# Patient Record
Sex: FEMALE | Race: BLACK OR AFRICAN AMERICAN | Employment: FULL TIME | URBAN - METROPOLITAN AREA
[De-identification: names, ages, dates, MRNs, and addresses within clinical notes are randomized per-mention and may not be internally consistent; named-entity substitution may affect disease eponyms.]

---

## 2017-02-12 ENCOUNTER — APPOINTMENT (OUTPATIENT)
Dept: CT IMAGING | Age: 47
End: 2017-02-12
Attending: FAMILY MEDICINE
Payer: MEDICAID

## 2017-02-12 ENCOUNTER — HOSPITAL ENCOUNTER (OUTPATIENT)
Age: 47
Discharge: HOME | End: 2017-02-12
Attending: FAMILY MEDICINE
Payer: MEDICAID

## 2017-02-12 VITALS
SYSTOLIC BLOOD PRESSURE: 141 MMHG | OXYGEN SATURATION: 100 % | RESPIRATION RATE: 18 BRPM | DIASTOLIC BLOOD PRESSURE: 86 MMHG | TEMPERATURE: 98 F | BODY MASS INDEX: 33 KG/M2 | WEIGHT: 202 LBS | HEART RATE: 81 BPM

## 2017-02-12 DIAGNOSIS — R31.9 HEMATURIA: ICD-10-CM

## 2017-02-12 DIAGNOSIS — N12 PYELONEPHRITIS: Primary | ICD-10-CM

## 2017-02-12 DIAGNOSIS — R10.9 RIGHT FLANK PAIN: ICD-10-CM

## 2017-02-12 LAB
POCT BILIRUBIN URINE: NEGATIVE
POCT GLUCOSE URINE: NEGATIVE MG/DL
POCT KETONE URINE: NEGATIVE MG/DL
POCT NITRITE URINE: NEGATIVE
POCT PH URINE: 5.5 (ref 5–8)
POCT PROTEIN URINE: NEGATIVE MG/DL
POCT SPECIFIC GRAVITY URINE: 1.02
POCT URINE CLARITY: CLEAR
POCT URINE PREGNANCY: NEGATIVE
POCT UROBILINOGEN URINE: 0.2 MG/DL

## 2017-02-12 PROCEDURE — 99214 OFFICE O/P EST MOD 30 MIN: CPT

## 2017-02-12 PROCEDURE — 74176 CT ABD & PELVIS W/O CONTRAST: CPT

## 2017-02-12 PROCEDURE — 87086 URINE CULTURE/COLONY COUNT: CPT | Performed by: FAMILY MEDICINE

## 2017-02-12 PROCEDURE — 81025 URINE PREGNANCY TEST: CPT | Performed by: FAMILY MEDICINE

## 2017-02-12 PROCEDURE — 81002 URINALYSIS NONAUTO W/O SCOPE: CPT | Performed by: FAMILY MEDICINE

## 2017-02-12 RX ORDER — IBUPROFEN 600 MG/1
600 TABLET ORAL ONCE
Status: COMPLETED | OUTPATIENT
Start: 2017-02-12 | End: 2017-02-12

## 2017-02-12 RX ORDER — CIPROFLOXACIN 500 MG/1
500 TABLET, FILM COATED ORAL 2 TIMES DAILY
Qty: 14 TABLET | Refills: 0 | Status: SHIPPED | OUTPATIENT
Start: 2017-02-12 | End: 2017-08-08 | Stop reason: ALTCHOICE

## 2017-02-12 NOTE — ED PROVIDER NOTES
Patient Seen in: THE [facility] In [facility] END    History   Patient presents with:  Back Pain: Rt.    Stated Complaint: BACK PAIN    HPI    This 49-year-old female presents to the office with right-sided flank pain which is been present for about Current:/86 mmHg  Pulse 81  Temp(Src) 98.3 °F (36.8 °C) (Oral)  Resp 18  Wt 91.627 kg  SpO2 100%  LMP         Physical Exam    General: WH/WN/WD, in moderate discomfort due to right flank pain, A and O times 3  HEAD: Normocephalic, atraumatic localize potential stones in the cranio-caudal plane. Dose reduction techniques were used. Dose information is transmitted to the [address] [facility]) [facility] ([address]) which includes the Dose Index Registry.   PATIENT She may continue with ibuprofen as needed for discomfort. She is instructed to go the emergency room if she has any worsening symptoms. She is to follow-up with her primary doctor in 2-3 days if not improving.       Disposition and Plan     Clinical Impre

## 2017-02-12 NOTE — ED INITIAL ASSESSMENT (HPI)
Rt. Mid to low back pain for about 1 week. Today seem to travel to her Rt. Abdomen. Some urinary urgency. Dad has hx of kidney stones.   Pt. Denies any new exercise programs

## 2017-02-13 NOTE — ED NOTES
Patient called stating that she is afraid to take Cipro that was prescribed to her due to the side effects she may experience. Patient states she has not taken any of the med.  Patient voices concerns that she may develop liver issues, may have hallucinatio

## 2017-08-08 ENCOUNTER — APPOINTMENT (OUTPATIENT)
Dept: GENERAL RADIOLOGY | Age: 47
End: 2017-08-08
Attending: FAMILY MEDICINE
Payer: COMMERCIAL

## 2017-08-08 ENCOUNTER — HOSPITAL ENCOUNTER (OUTPATIENT)
Age: 47
Discharge: HOME | End: 2017-08-08
Attending: FAMILY MEDICINE
Payer: COMMERCIAL

## 2017-08-08 ENCOUNTER — APPOINTMENT (OUTPATIENT)
Dept: CT IMAGING | Age: 47
End: 2017-08-08
Attending: FAMILY MEDICINE
Payer: COMMERCIAL

## 2017-08-08 VITALS
DIASTOLIC BLOOD PRESSURE: 90 MMHG | OXYGEN SATURATION: 100 % | RESPIRATION RATE: 16 BRPM | TEMPERATURE: 98 F | SYSTOLIC BLOOD PRESSURE: 154 MMHG | HEART RATE: 86 BPM

## 2017-08-08 DIAGNOSIS — I31.3 IDIOPATHIC PERICARDIAL EFFUSION: ICD-10-CM

## 2017-08-08 DIAGNOSIS — R10.9 ABDOMINAL PAIN OF UNKNOWN ETIOLOGY: Primary | ICD-10-CM

## 2017-08-08 LAB
#LYMPHOCYTE IC: 2.9 X10ˆ3/UL (ref 0.9–3.2)
#MXD IC: 0.4 X10ˆ3/UL (ref 0.1–1)
#NEUTROPHIL IC: 2.7 X10ˆ3/UL (ref 1.3–6.7)
CREAT SERPL-MCNC: 0.9 MG/DL (ref 0.4–1)
FREE T4: 0.9 NG/DL (ref 0.9–1.8)
GLUCOSE BLD-MCNC: 102 MG/DL (ref 65–99)
HCT IC: 35.6 % (ref 37–54)
HGB IC: 11.5 G/DL (ref 11.7–16)
ISTAT BLOOD GAS TCO2: 26 MMOL/L (ref 22–32)
ISTAT BUN: 6 MG/DL (ref 8–20)
ISTAT CHLORIDE: 102 MMOL/L (ref 101–111)
ISTAT HEMATOCRIT: 38 % (ref 37–54)
ISTAT IONIZED CALCIUM: 1.19 MMOL/L (ref 1.12–1.32)
ISTAT POTASSIUM: 4.1 MMOL/L (ref 3.6–5.1)
ISTAT SODIUM: 140 MMOL/L (ref 136–144)
ISTAT TROPONIN: <0.1 NG/ML (ref ?–0.1)
LIPASE: 176 U/L (ref 73–393)
LYMPHOCYTES NFR BLD AUTO: 48.2 %
MCH IC: 30.4 PG (ref 27–33.2)
MCHC IC: 32.3 G/DL (ref 31–37)
MCV IC: 94.2 FL (ref 81–100)
MIXED CELL %: 7.4 %
NEUTROPHILS NFR BLD AUTO: 44.4 %
PLT IC: 253 X10ˆ3/UL (ref 150–450)
POCT BILIRUBIN URINE: NEGATIVE
POCT GLUCOSE URINE: NEGATIVE MG/DL
POCT KETONE URINE: NEGATIVE MG/DL
POCT NITRITE URINE: NEGATIVE
POCT PH URINE: 6 (ref 5–8)
POCT PROTEIN URINE: NEGATIVE MG/DL
POCT SPECIFIC GRAVITY URINE: 1.01
POCT URINE CLARITY: CLEAR
POCT URINE COLOR: YELLOW
POCT UROBILINOGEN URINE: 0.2 MG/DL
RBC IC: 3.78 X10ˆ6/UL (ref 3.8–5.1)
TSI SER-ACNC: 2.36 MIU/ML (ref 0.35–5.5)
WBC IC: 6 X10ˆ3/UL (ref 4–13)

## 2017-08-08 PROCEDURE — 99215 OFFICE O/P EST HI 40 MIN: CPT

## 2017-08-08 PROCEDURE — 84484 ASSAY OF TROPONIN QUANT: CPT

## 2017-08-08 PROCEDURE — 93010 ELECTROCARDIOGRAM REPORT: CPT

## 2017-08-08 PROCEDURE — 36415 COLL VENOUS BLD VENIPUNCTURE: CPT

## 2017-08-08 PROCEDURE — 71020 XR CHEST PA + LAT CHEST (CPT=71020): CPT | Performed by: FAMILY MEDICINE

## 2017-08-08 PROCEDURE — 74176 CT ABD & PELVIS W/O CONTRAST: CPT | Performed by: FAMILY MEDICINE

## 2017-08-08 PROCEDURE — 83690 ASSAY OF LIPASE: CPT | Performed by: FAMILY MEDICINE

## 2017-08-08 PROCEDURE — 87086 URINE CULTURE/COLONY COUNT: CPT | Performed by: FAMILY MEDICINE

## 2017-08-08 PROCEDURE — 81002 URINALYSIS NONAUTO W/O SCOPE: CPT | Performed by: FAMILY MEDICINE

## 2017-08-08 PROCEDURE — 84439 ASSAY OF FREE THYROXINE: CPT | Performed by: FAMILY MEDICINE

## 2017-08-08 PROCEDURE — 93005 ELECTROCARDIOGRAM TRACING: CPT

## 2017-08-08 PROCEDURE — 84443 ASSAY THYROID STIM HORMONE: CPT | Performed by: FAMILY MEDICINE

## 2017-08-08 PROCEDURE — 80047 BASIC METABLC PNL IONIZED CA: CPT

## 2017-08-08 PROCEDURE — 85025 COMPLETE CBC W/AUTO DIFF WBC: CPT | Performed by: FAMILY MEDICINE

## 2017-08-08 NOTE — ED INITIAL ASSESSMENT (HPI)
Pt c/o 2 weeks of dull pain to entire middle of body. States started on left abdomen, then switched to right abdomen, then began to wrap around middle to back for the last 2 days. Denies nausea, diarrhea, constipation, dysuria or menstrual problems.   Cam

## 2017-08-09 NOTE — ED NOTES
Pt eating and drinking. States her abdomen is feeling better now. Awaiting Cardiology return call. Pt remains stable.

## 2017-08-10 NOTE — ED NOTES
Pt contacts clinic regarding test results. Pt's results reviewed from visit including lipase and tsh/t4 which both were resulted within normal limits.

## 2017-08-11 ENCOUNTER — APPOINTMENT (OUTPATIENT)
Dept: GENERAL RADIOLOGY | Facility: HOSPITAL | Age: 47
End: 2017-08-11
Attending: EMERGENCY MEDICINE
Payer: COMMERCIAL

## 2017-08-11 ENCOUNTER — HOSPITAL ENCOUNTER (EMERGENCY)
Facility: HOSPITAL | Age: 47
Discharge: HOME | End: 2017-08-11
Attending: EMERGENCY MEDICINE
Payer: COMMERCIAL

## 2017-08-11 ENCOUNTER — APPOINTMENT (OUTPATIENT)
Dept: CT IMAGING | Facility: HOSPITAL | Age: 47
End: 2017-08-11
Attending: EMERGENCY MEDICINE
Payer: COMMERCIAL

## 2017-08-11 VITALS
RESPIRATION RATE: 16 BRPM | BODY MASS INDEX: 32.47 KG/M2 | HEIGHT: 66 IN | OXYGEN SATURATION: 98 % | TEMPERATURE: 97 F | HEART RATE: 81 BPM | SYSTOLIC BLOOD PRESSURE: 127 MMHG | WEIGHT: 202 LBS | DIASTOLIC BLOOD PRESSURE: 99 MMHG

## 2017-08-11 DIAGNOSIS — R07.9 ACUTE CHEST PAIN: Primary | ICD-10-CM

## 2017-08-11 LAB
ALBUMIN SERPL-MCNC: 3.9 G/DL (ref 3.5–4.8)
ALP LIVER SERPL-CCNC: 81 U/L (ref 39–100)
ALT SERPL-CCNC: 20 U/L (ref 14–54)
AST SERPL-CCNC: 9 U/L (ref 15–41)
BASOPHILS # BLD AUTO: 0.01 X10(3) UL (ref 0–0.1)
BASOPHILS NFR BLD AUTO: 0.2 %
BILIRUB SERPL-MCNC: 0.5 MG/DL (ref 0.1–2)
BUN BLD-MCNC: 18 MG/DL (ref 8–20)
CALCIUM BLD-MCNC: 9.2 MG/DL (ref 8.3–10.3)
CHLORIDE: 104 MMOL/L (ref 101–111)
CO2: 27 MMOL/L (ref 22–32)
CREAT BLD-MCNC: 1.03 MG/DL (ref 0.55–1.02)
D-DIMER: 0.95 UG/ML FEU (ref 0–0.49)
EOSINOPHIL # BLD AUTO: 0.09 X10(3) UL (ref 0–0.3)
EOSINOPHIL NFR BLD AUTO: 1.6 %
ERYTHROCYTE [DISTWIDTH] IN BLOOD BY AUTOMATED COUNT: 13.5 % (ref 11.5–16)
GLUCOSE BLD-MCNC: 105 MG/DL (ref 70–99)
HCT VFR BLD AUTO: 36.9 % (ref 34–50)
HGB BLD-MCNC: 11.8 G/DL (ref 12–16)
IMMATURE GRANULOCYTE COUNT: 0.04 X10(3) UL (ref 0–1)
IMMATURE GRANULOCYTE RATIO %: 0.7 %
LYMPHOCYTES # BLD AUTO: 2.36 X10(3) UL (ref 0.9–4)
LYMPHOCYTES NFR BLD AUTO: 40.9 %
M PROTEIN MFR SERPL ELPH: 8.6 G/DL (ref 6.1–8.3)
MCH RBC QN AUTO: 29.6 PG (ref 27–33.2)
MCHC RBC AUTO-ENTMCNC: 32 G/DL (ref 31–37)
MCV RBC AUTO: 92.7 FL (ref 81–100)
MONOCYTES # BLD AUTO: 0.38 X10(3) UL (ref 0.1–0.6)
MONOCYTES NFR BLD AUTO: 6.6 %
NEUTROPHIL ABS PRELIM: 2.89 X10 (3) UL (ref 1.3–6.7)
NEUTROPHILS # BLD AUTO: 2.89 X10(3) UL (ref 1.3–6.7)
NEUTROPHILS NFR BLD AUTO: 50 %
PLATELET # BLD AUTO: 235 10(3)UL (ref 150–450)
POTASSIUM SERPL-SCNC: 4 MMOL/L (ref 3.6–5.1)
RBC # BLD AUTO: 3.98 X10(6)UL (ref 3.8–5.1)
RED CELL DISTRIBUTION WIDTH-SD: 45.9 FL (ref 35.1–46.3)
SODIUM SERPL-SCNC: 138 MMOL/L (ref 136–144)
TROPONIN: <0.046 NG/ML (ref ?–0.05)
WBC # BLD AUTO: 5.8 X10(3) UL (ref 4–13)

## 2017-08-11 PROCEDURE — 71010 XR CHEST AP PORTABLE  (CPT=71010): CPT | Performed by: EMERGENCY MEDICINE

## 2017-08-11 PROCEDURE — 84484 ASSAY OF TROPONIN QUANT: CPT

## 2017-08-11 PROCEDURE — 99285 EMERGENCY DEPT VISIT HI MDM: CPT

## 2017-08-11 PROCEDURE — 93010 ELECTROCARDIOGRAM REPORT: CPT

## 2017-08-11 PROCEDURE — 80053 COMPREHEN METABOLIC PANEL: CPT | Performed by: EMERGENCY MEDICINE

## 2017-08-11 PROCEDURE — 85025 COMPLETE CBC W/AUTO DIFF WBC: CPT

## 2017-08-11 PROCEDURE — 71275 CT ANGIOGRAPHY CHEST: CPT | Performed by: EMERGENCY MEDICINE

## 2017-08-11 PROCEDURE — 85378 FIBRIN DEGRADE SEMIQUANT: CPT | Performed by: EMERGENCY MEDICINE

## 2017-08-11 PROCEDURE — 84484 ASSAY OF TROPONIN QUANT: CPT | Performed by: EMERGENCY MEDICINE

## 2017-08-11 PROCEDURE — 93005 ELECTROCARDIOGRAM TRACING: CPT

## 2017-08-11 PROCEDURE — 96360 HYDRATION IV INFUSION INIT: CPT

## 2017-08-11 PROCEDURE — 80053 COMPREHEN METABOLIC PANEL: CPT

## 2017-08-11 PROCEDURE — 85025 COMPLETE CBC W/AUTO DIFF WBC: CPT | Performed by: EMERGENCY MEDICINE

## 2017-08-11 RX ORDER — SODIUM CHLORIDE 9 MG/ML
INJECTION, SOLUTION INTRAVENOUS ONCE
Status: COMPLETED | OUTPATIENT
Start: 2017-08-11 | End: 2017-08-11

## 2017-08-11 NOTE — ED PROVIDER NOTES
Patient Seen in: [facility] Emergency Department    History   Patient presents with:  Chest Pain Angina (cardiovascular)    Stated Complaint: CHEST PAIN    HPI    49-year-old female here for evaluation of chest pain.   She woke up this morning with s BMI 32.60 kg/m²         Physical Exam      Constitutional: Pt is oriented to person, place, and time. Appears well-developed and well-nourished. Head: Normocephalic and atraumatic.    Nose: Nose normal.   Eyes: EOM are normal. Pupils are equal, round, a HGB 11.8 (*)     All other components within normal limits   TROPONIN I - Normal   CBC WITH DIFFERENTIAL WITH PLATELET    Narrative: The following orders were created for panel order CBC WITH DIFFERENTIAL WITH PLATELET.   Procedure for any worsening symptoms.     Disposition and Plan     Clinical Impression:  Acute chest pain  (primary encounter diagnosis)    Disposition:  Discharge    Follow-up:  Morelia Ling MD    In 1 week  As needed, If symptoms worsen      Medications Prescribed

## 2017-08-11 NOTE — ED INITIAL ASSESSMENT (HPI)
Patient presents with chest pain and shortness of breath that woke her from her sleep. She first noticed the shortness of breath, then noticed the chest pain. The pain is aching in nature.

## 2017-08-14 LAB
ATRIAL RATE: 83 BPM
P AXIS: 63 DEGREES
P-R INTERVAL: 166 MS
Q-T INTERVAL: 346 MS
QRS DURATION: 86 MS
QTC CALCULATION (BEZET): 406 MS
R AXIS: 24 DEGREES
T AXIS: 76 DEGREES
VENTRICULAR RATE: 83 BPM

## 2017-08-21 ENCOUNTER — LAB ENCOUNTER (OUTPATIENT)
Dept: LAB | Age: 47
End: 2017-08-21
Attending: INTERNAL MEDICINE
Payer: COMMERCIAL

## 2017-08-21 DIAGNOSIS — I31.3 PERICARDIAL EFFUSION: Primary | ICD-10-CM

## 2017-08-21 LAB
ANA SCREEN: NEGATIVE
BASOPHILS # BLD AUTO: 0.01 X10(3) UL (ref 0–0.1)
BASOPHILS NFR BLD AUTO: 0.2 %
BUN BLD-MCNC: 14 MG/DL (ref 8–20)
CALCIUM BLD-MCNC: 9.5 MG/DL (ref 8.3–10.3)
CHLORIDE: 105 MMOL/L (ref 101–111)
CO2: 29 MMOL/L (ref 22–32)
CREAT BLD-MCNC: 0.88 MG/DL (ref 0.55–1.02)
EOSINOPHIL # BLD AUTO: 0.05 X10(3) UL (ref 0–0.3)
EOSINOPHIL NFR BLD AUTO: 1.1 %
ERYTHROCYTE [DISTWIDTH] IN BLOOD BY AUTOMATED COUNT: 13.8 % (ref 11.5–16)
GLUCOSE BLD-MCNC: 90 MG/DL (ref 70–99)
HCT VFR BLD AUTO: 35.6 % (ref 34–50)
HGB BLD-MCNC: 11 G/DL (ref 12–16)
IMMATURE GRANULOCYTE COUNT: 0.02 X10(3) UL (ref 0–1)
IMMATURE GRANULOCYTE RATIO %: 0.4 %
LYMPHOCYTES # BLD AUTO: 1.47 X10(3) UL (ref 0.9–4)
LYMPHOCYTES NFR BLD AUTO: 32.5 %
MCH RBC QN AUTO: 29.5 PG (ref 27–33.2)
MCHC RBC AUTO-ENTMCNC: 30.9 G/DL (ref 31–37)
MCV RBC AUTO: 95.4 FL (ref 81–100)
MONOCYTES # BLD AUTO: 0.34 X10(3) UL (ref 0.1–0.6)
MONOCYTES NFR BLD AUTO: 7.5 %
NEUTROPHIL ABS PRELIM: 2.63 X10 (3) UL (ref 1.3–6.7)
NEUTROPHILS # BLD AUTO: 2.63 X10(3) UL (ref 1.3–6.7)
NEUTROPHILS NFR BLD AUTO: 58.3 %
PLATELET # BLD AUTO: 253 10(3)UL (ref 150–450)
POTASSIUM SERPL-SCNC: 4.2 MMOL/L (ref 3.6–5.1)
RBC # BLD AUTO: 3.73 X10(6)UL (ref 3.8–5.1)
RED CELL DISTRIBUTION WIDTH-SD: 48.5 FL (ref 35.1–46.3)
RHEUMATOID FACT SERPL-ACNC: <10 IU/ML (ref ?–15)
SODIUM SERPL-SCNC: 140 MMOL/L (ref 136–144)
TSI SER-ACNC: 4.2 MIU/ML (ref 0.35–5.5)
WBC # BLD AUTO: 4.5 X10(3) UL (ref 4–13)

## 2017-08-21 PROCEDURE — 86431 RHEUMATOID FACTOR QUANT: CPT

## 2017-08-21 PROCEDURE — 86038 ANTINUCLEAR ANTIBODIES: CPT

## 2017-08-21 PROCEDURE — 85025 COMPLETE CBC W/AUTO DIFF WBC: CPT

## 2017-08-21 PROCEDURE — 84443 ASSAY THYROID STIM HORMONE: CPT

## 2017-08-21 PROCEDURE — 80048 BASIC METABOLIC PNL TOTAL CA: CPT

## 2017-08-21 PROCEDURE — 36415 COLL VENOUS BLD VENIPUNCTURE: CPT

## 2017-08-23 ENCOUNTER — APPOINTMENT (OUTPATIENT)
Dept: ULTRASOUND IMAGING | Age: 47
End: 2017-08-23
Attending: PHYSICIAN ASSISTANT
Payer: COMMERCIAL

## 2017-08-23 ENCOUNTER — HOSPITAL ENCOUNTER (OUTPATIENT)
Age: 47
Discharge: HOME | End: 2017-08-23
Payer: COMMERCIAL

## 2017-08-23 VITALS
HEART RATE: 74 BPM | DIASTOLIC BLOOD PRESSURE: 92 MMHG | RESPIRATION RATE: 16 BRPM | SYSTOLIC BLOOD PRESSURE: 128 MMHG | TEMPERATURE: 98 F | OXYGEN SATURATION: 100 %

## 2017-08-23 DIAGNOSIS — D25.0 SUBMUCOUS LEIOMYOMA OF UTERUS: ICD-10-CM

## 2017-08-23 DIAGNOSIS — R10.2 PELVIC PAIN IN FEMALE: Primary | ICD-10-CM

## 2017-08-23 LAB
POCT BILIRUBIN URINE: NEGATIVE
POCT GLUCOSE URINE: NEGATIVE MG/DL
POCT KETONE URINE: NEGATIVE MG/DL
POCT NITRITE URINE: NEGATIVE
POCT PH URINE: 7.5 (ref 5–8)
POCT PROTEIN URINE: NEGATIVE MG/DL
POCT SPECIFIC GRAVITY URINE: 1.02
POCT URINE CLARITY: CLEAR
POCT URINE COLOR: YELLOW
POCT UROBILINOGEN URINE: 0.2 MG/DL

## 2017-08-23 PROCEDURE — 76856 US EXAM PELVIC COMPLETE: CPT | Performed by: PHYSICIAN ASSISTANT

## 2017-08-23 PROCEDURE — 87086 URINE CULTURE/COLONY COUNT: CPT

## 2017-08-23 PROCEDURE — 81002 URINALYSIS NONAUTO W/O SCOPE: CPT

## 2017-08-23 PROCEDURE — 99214 OFFICE O/P EST MOD 30 MIN: CPT

## 2017-08-23 PROCEDURE — 93975 VASCULAR STUDY: CPT | Performed by: PHYSICIAN ASSISTANT

## 2017-08-23 PROCEDURE — 76830 TRANSVAGINAL US NON-OB: CPT | Performed by: PHYSICIAN ASSISTANT

## 2017-08-23 NOTE — ED INITIAL ASSESSMENT (HPI)
States same back pain that she was seen for 3 weeks ago. \"It never went away\"  C/o lower abdominal pain that \"feels like when I have a urinary infection or a yeast infection like that\"  Denies any vaginal irritation or drainage. Afebrile.   Denies con

## 2017-08-23 NOTE — ED PROVIDER NOTES
Patient Seen in: [facility]    History   Patient presents with:  Back Pain (musculoskeletal)  Abdominal Pain    Stated Complaint: 3 WKS BACK PAIN/1 DAY GROIN PAIN    ASHKAN    Binh Maru is a 75-year-old female presents today for evalua Smokeless tobacco: Never Used                      Alcohol use: Yes               Review of Systems    Positive for stated complaint: 3 WKS BACK PAIN/1 DAY GROIN PAIN  Other systems are as noted in HPI.   Co Us Pelvis Ana Appiah Doppler ([id number])+(edw [id number])    Result Date: 8/23/2017  PROCEDURE:  US PELVIS EV W DOPPLER (BBC=[id number])+(RQU [id number])  COMPARISON:  [address], US PEL - EV GYN -SET, 10/27/2007, 10:10.   INDICATIONS:  3 WKS BACK PAIN/1 DAY GROI encourage her to establish care with a gynecologist for further workup. She expresses understanding. I recommend NSAID use for pain control. She has no history of chronic kidney disease or gastric bleeding.   The patient is encouraged to return if any co

## 2017-09-12 ENCOUNTER — HOSPITAL ENCOUNTER (OUTPATIENT)
Dept: CV DIAGNOSTICS | Age: 47
Discharge: HOME | End: 2017-09-12
Attending: INTERNAL MEDICINE
Payer: COMMERCIAL

## 2017-09-12 DIAGNOSIS — I31.3: ICD-10-CM

## 2017-09-12 PROCEDURE — 93306 TTE W/DOPPLER COMPLETE: CPT | Performed by: INTERNAL MEDICINE

## 2019-05-22 ENCOUNTER — APPOINTMENT (OUTPATIENT)
Dept: GENERAL RADIOLOGY | Age: 49
End: 2019-05-22
Attending: FAMILY MEDICINE
Payer: COMMERCIAL

## 2019-05-22 ENCOUNTER — HOSPITAL ENCOUNTER (OUTPATIENT)
Age: 49
Discharge: HOME | End: 2019-05-22
Attending: FAMILY MEDICINE
Payer: COMMERCIAL

## 2019-05-22 VITALS
SYSTOLIC BLOOD PRESSURE: 140 MMHG | DIASTOLIC BLOOD PRESSURE: 87 MMHG | RESPIRATION RATE: 18 BRPM | OXYGEN SATURATION: 100 % | HEART RATE: 74 BPM | TEMPERATURE: 98 F | WEIGHT: 210 LBS | HEIGHT: 66 IN | BODY MASS INDEX: 33.75 KG/M2

## 2019-05-22 DIAGNOSIS — M54.50 BACK PAIN, LUMBOSACRAL: Primary | ICD-10-CM

## 2019-05-22 DIAGNOSIS — S63.92XA SPRAIN OF LEFT HAND, INITIAL ENCOUNTER: ICD-10-CM

## 2019-05-22 PROCEDURE — 73130 X-RAY EXAM OF HAND: CPT | Performed by: FAMILY MEDICINE

## 2019-05-22 PROCEDURE — 87086 URINE CULTURE/COLONY COUNT: CPT | Performed by: FAMILY MEDICINE

## 2019-05-22 PROCEDURE — 81002 URINALYSIS NONAUTO W/O SCOPE: CPT | Performed by: FAMILY MEDICINE

## 2019-05-22 PROCEDURE — 99214 OFFICE O/P EST MOD 30 MIN: CPT

## 2019-05-22 RX ORDER — METAXALONE 800 MG/1
800 TABLET ORAL 3 TIMES DAILY
Qty: 15 TABLET | Refills: 0 | Status: SHIPPED | OUTPATIENT
Start: 2019-05-22 | End: 2019-05-27

## 2019-05-22 NOTE — ED INITIAL ASSESSMENT (HPI)
Low back - started 2 weeks ago ,got worse yesterdayon the mod back. Pt has been ibuprofen 2 tabs  Every x 1   Left hand pain -  Since yesterday, denies any injury  Or numbness or tingling on fingers. With  Painful when squeezing hand.

## 2019-05-22 NOTE — ED PROVIDER NOTES
Patient Seen in: [facility]    History   Patient presents with:  Back Pain  Hand Pain    Stated Complaint: back and left hand pain x 2 weeks     HPI  Is a 24-year-old female coming in with complaints of left hand pain-swelling n Exam  GEN: Not in any acute distress, making good conversation, answering appropriately   SKIN: No pallor, no erythema, no cyanosis, warm and dry  Eyes: wnl, normal conjunctiva   HEAD: Normocephalic, atraumatic  EENT: OP - wnl, moist, Nares normal  NECK: F Sig: Take 1 tablet (800 mg total) by mouth 3 (three) times daily for 5 days.           Dispense:  15 tablet          Refill:  0    Xr Hand (min 3 Views), Left (cpt=73130)    Result Date: 5/22/2019  PROCEDURE:  XR HAND (MIN 3 VIEWS), LEFT (CPT=73130) R-0

## 2019-05-31 ENCOUNTER — OFFICE VISIT (OUTPATIENT)
Dept: INTERNAL MEDICINE CLINIC | Facility: CLINIC | Age: 49
End: 2019-05-31
Payer: COMMERCIAL

## 2019-05-31 ENCOUNTER — TELEPHONE (OUTPATIENT)
Dept: INTERNAL MEDICINE CLINIC | Facility: CLINIC | Age: 49
End: 2019-05-31

## 2019-05-31 ENCOUNTER — LAB ENCOUNTER (OUTPATIENT)
Dept: LAB | Age: 49
End: 2019-05-31
Attending: INTERNAL MEDICINE
Payer: COMMERCIAL

## 2019-05-31 VITALS
DIASTOLIC BLOOD PRESSURE: 88 MMHG | HEART RATE: 84 BPM | TEMPERATURE: 99 F | HEIGHT: 65.5 IN | WEIGHT: 210 LBS | BODY MASS INDEX: 34.57 KG/M2 | RESPIRATION RATE: 20 BRPM | SYSTOLIC BLOOD PRESSURE: 128 MMHG

## 2019-05-31 DIAGNOSIS — R19.8 CHANGE IN BOWEL FUNCTION: ICD-10-CM

## 2019-05-31 DIAGNOSIS — R14.0 ABDOMINAL BLOATING: ICD-10-CM

## 2019-05-31 DIAGNOSIS — Z12.39 SCREENING FOR BREAST CANCER: ICD-10-CM

## 2019-05-31 DIAGNOSIS — M54.50 ACUTE BILATERAL LOW BACK PAIN WITHOUT SCIATICA: ICD-10-CM

## 2019-05-31 DIAGNOSIS — Z00.00 ENCOUNTER FOR PREVENTATIVE ADULT HEALTH CARE EXAMINATION: Primary | ICD-10-CM

## 2019-05-31 DIAGNOSIS — Z12.11 SCREENING FOR COLON CANCER: ICD-10-CM

## 2019-05-31 DIAGNOSIS — Z00.00 ENCOUNTER FOR PREVENTATIVE ADULT HEALTH CARE EXAMINATION: ICD-10-CM

## 2019-05-31 PROCEDURE — 36415 COLL VENOUS BLD VENIPUNCTURE: CPT

## 2019-05-31 PROCEDURE — 80061 LIPID PANEL: CPT

## 2019-05-31 PROCEDURE — 85025 COMPLETE CBC W/AUTO DIFF WBC: CPT

## 2019-05-31 PROCEDURE — 99386 PREV VISIT NEW AGE 40-64: CPT | Performed by: INTERNAL MEDICINE

## 2019-05-31 PROCEDURE — 80053 COMPREHEN METABOLIC PANEL: CPT

## 2019-05-31 PROCEDURE — 81001 URINALYSIS AUTO W/SCOPE: CPT

## 2019-05-31 PROCEDURE — 84443 ASSAY THYROID STIM HORMONE: CPT

## 2019-05-31 RX ORDER — DICYCLOMINE HYDROCHLORIDE 10 MG/1
10 CAPSULE ORAL
Qty: 60 CAPSULE | Refills: 0 | Status: SHIPPED | OUTPATIENT
Start: 2019-05-31

## 2019-05-31 NOTE — PROGRESS NOTES
Alex Smith is a 46year old female. HPI:   Patient presents with:  Establish Care  Physical: Previous PAP WNL per pt by Dr Samia Hernandez - previous PCP; records requested   Low Back Pain: Pt c/o right side low back pain.  Sometimes radiates to the left intolerance  ALLERGY:   Sulfa Antibiotics       RASH  PAST HISTORY:     Current Outpatient Medications:   •  Dicyclomine HCl 10 MG Oral Cap, Take 1 capsule (10 mg total) by mouth 3 (three) times daily before meals. , Disp: 60 capsule, Rfl: 0  •  Diclofenac cancer  3. Screening for colon cancer  Age appropriate health guidance and counseling provided. Screening labs ordered as below. Mammogram ordered. Referred to GI for colonoscopy. States she had normal pap smear in 2017; if so will be due in 1-3 years. myself for follow up on chronic issues, or earlier if acute issues arise.     Ricardo Galeano MD

## 2019-05-31 NOTE — PATIENT INSTRUCTIONS
- Get blood wok done today  - Schedule mammogram ([phone]). - Follow up with [facility]/Dr. Ziegler Just to discuss your abdominal symptoms and colonoscopy. Ask for first available appointment.  - Start dicyclomine.   Take 1 capsule before each meal

## 2019-06-01 NOTE — TELEPHONE ENCOUNTER
Received report - updated in [facility]. In your inbasket.   Done on 10/17/2017 by Dr Moe Cali and negative HPV

## 2019-06-03 DIAGNOSIS — R31.21 ASYMPTOMATIC MICROSCOPIC HEMATURIA: Primary | ICD-10-CM

## 2019-06-07 ENCOUNTER — HOSPITAL ENCOUNTER (OUTPATIENT)
Dept: MAMMOGRAPHY | Age: 49
Discharge: HOME | End: 2019-06-07
Attending: INTERNAL MEDICINE
Payer: COMMERCIAL

## 2019-06-07 DIAGNOSIS — Z12.39 SCREENING FOR BREAST CANCER: ICD-10-CM

## 2019-06-07 PROCEDURE — 77063 BREAST TOMOSYNTHESIS BI: CPT | Performed by: INTERNAL MEDICINE

## 2019-06-07 PROCEDURE — 77067 SCR MAMMO BI INCL CAD: CPT | Performed by: INTERNAL MEDICINE

## 2023-09-21 ENCOUNTER — OCC HEALTH (OUTPATIENT)
Dept: OCCUPATIONAL MEDICINE | Age: 53
End: 2023-09-21
Attending: FAMILY MEDICINE

## 2023-09-23 ENCOUNTER — OFFICE VISIT (OUTPATIENT)
Dept: OCCUPATIONAL MEDICINE | Age: 53
End: 2023-09-23
Attending: PREVENTIVE MEDICINE

## (undated) NOTE — ED AVS SNAPSHOT
Willard Moore   MRN: [id number]    Department:  [facility] Emergency Department   Date of Visit:  8/11/2017           Disclosure     Insurance plans vary and the physician(s) referred by the ER may not be covered by your plan.  Please contact your If you have been prescribed any medication(s), please fill your prescription right away and begin taking the medication(s) as directed    If the emergency physician has read X-rays, these will be re-interpreted by a radiologist.  If there is a significant

## (undated) NOTE — LETTER
[facility] IN [place]  [address] [address]  Dept: [phone]  Dept Fax: [phone]      August 8, 2017    Patient: Danni Priti   Date of Visit: 8/8/2017       To Whom It May Concern:     Ky Mendoza was seen and treat

## (undated) NOTE — ED AVS SNAPSHOT
[facility] in [address] [place] [address] Drive,[address]    [address] [facility]    Phone:  [phone]    Fax:  [phone]    MRN: [id number]    Department:  [facility] END   Date of Visit:  2/12/2017 labs or cultures were done today, you will be contacted only if the results warrant a change in your current course of treatment.       Discharge References/Attachments     URINARY TRACT INFECTIONS (Alin Pulido), UNDERSTANDING (ENGLISH)      Disclosure     [facility]. Follow-up care is at the discretion of that Physician. IF THERE IS ANY CHANGE OR WORSENING OF YOUR CONDITION, CALL YOUR PRIMARY CARE PHYSICIAN AT ONCE OR GO TO THE EMERGENCY DEPARTMENT.     If you have been prescribed any medication(s), - If you don’t have insurance, [facility] has partnered with [facility] to help you get signed up for insurance coverage.   [facility] is a Federal Navigator program that can help with your Affordable Care Act cover SPLEEN:  Nonenlarged. AORTA/VASCULAR:  No abdominal aortic aneurysm. RETROPERITONEUM:  No adenopathy. BOWEL/MESENTERY:  Normal bowel caliber. Colonic redundancy. No acute inflammatory change. Surgically absent appendix. No ascites.   ABDOMINAL WALL:  Umb